# Patient Record
Sex: MALE | Race: WHITE | NOT HISPANIC OR LATINO | URBAN - METROPOLITAN AREA
[De-identification: names, ages, dates, MRNs, and addresses within clinical notes are randomized per-mention and may not be internally consistent; named-entity substitution may affect disease eponyms.]

---

## 2018-05-14 ENCOUNTER — OFFICE (OUTPATIENT)
Dept: URBAN - METROPOLITAN AREA CLINIC 75 | Facility: CLINIC | Age: 68
End: 2018-05-14

## 2018-05-14 VITALS
HEIGHT: 70 IN | WEIGHT: 158 LBS | HEART RATE: 76 BPM | SYSTOLIC BLOOD PRESSURE: 116 MMHG | DIASTOLIC BLOOD PRESSURE: 72 MMHG

## 2018-05-14 DIAGNOSIS — Z85.038 PERSONAL HISTORY OF OTHER MALIGNANT NEOPLASM OF LARGE INTEST: ICD-10-CM

## 2018-05-14 DIAGNOSIS — R19.7 DIARRHEA, UNSPECIFIED: ICD-10-CM

## 2018-05-14 PROCEDURE — 99203 OFFICE O/P NEW LOW 30 MIN: CPT | Performed by: INTERNAL MEDICINE

## 2018-05-14 RX ORDER — DICYCLOMINE HYDROCHLORIDE 20.6 MG/1
TABLET ORAL
Qty: 120 | Refills: 6 | Status: ACTIVE
Start: 2018-05-14

## 2019-01-24 ENCOUNTER — APPOINTMENT (OUTPATIENT)
Dept: LAB | Facility: HOSPITAL | Age: 69
End: 2019-01-24

## 2019-01-24 ENCOUNTER — OFFICE VISIT (OUTPATIENT)
Dept: GASTROENTEROLOGY | Facility: CLINIC | Age: 69
End: 2019-01-24

## 2019-01-24 VITALS
TEMPERATURE: 97.6 F | HEIGHT: 70 IN | DIASTOLIC BLOOD PRESSURE: 84 MMHG | BODY MASS INDEX: 22.5 KG/M2 | SYSTOLIC BLOOD PRESSURE: 136 MMHG | WEIGHT: 157.2 LBS

## 2019-01-24 DIAGNOSIS — C18.9 MALIGNANT NEOPLASM OF COLON, UNSPECIFIED PART OF COLON (HCC): ICD-10-CM

## 2019-01-24 DIAGNOSIS — R15.0 INCOMPLETE DEFECATION: ICD-10-CM

## 2019-01-24 DIAGNOSIS — R19.7 DIARRHEA, UNSPECIFIED TYPE: Primary | ICD-10-CM

## 2019-01-24 LAB
AMYLASE SERPL-CCNC: 99 U/L (ref 28–100)
BASOPHILS # BLD AUTO: 0.12 10*3/MM3 (ref 0–0.2)
BASOPHILS NFR BLD AUTO: 1.3 % (ref 0–1.5)
CEA SERPL-MCNC: 2.11 NG/ML
DEPRECATED RDW RBC AUTO: 47.8 FL (ref 37–54)
EOSINOPHIL # BLD AUTO: 0.36 10*3/MM3 (ref 0–0.7)
EOSINOPHIL NFR BLD AUTO: 3.9 % (ref 0.3–6.2)
ERYTHROCYTE [DISTWIDTH] IN BLOOD BY AUTOMATED COUNT: 13.9 % (ref 11.5–14.5)
HCT VFR BLD AUTO: 48.9 % (ref 40.4–52.2)
HGB BLD-MCNC: 15.9 G/DL (ref 13.7–17.6)
IMM GRANULOCYTES # BLD AUTO: 0.02 10*3/MM3 (ref 0–0.03)
IMM GRANULOCYTES NFR BLD AUTO: 0.2 % (ref 0–0.5)
LIPASE SERPL-CCNC: 20 U/L (ref 13–60)
LYMPHOCYTES # BLD AUTO: 2.43 10*3/MM3 (ref 0.9–4.8)
LYMPHOCYTES NFR BLD AUTO: 26.5 % (ref 19.6–45.3)
MCH RBC QN AUTO: 30.5 PG (ref 27–32.7)
MCHC RBC AUTO-ENTMCNC: 32.5 G/DL (ref 32.6–36.4)
MCV RBC AUTO: 93.7 FL (ref 79.8–96.2)
MONOCYTES # BLD AUTO: 0.68 10*3/MM3 (ref 0.2–1.2)
MONOCYTES NFR BLD AUTO: 7.4 % (ref 5–12)
NEUTROPHILS # BLD AUTO: 5.57 10*3/MM3 (ref 1.9–8.1)
NEUTROPHILS NFR BLD AUTO: 60.7 % (ref 42.7–76)
PLATELET # BLD AUTO: 264 10*3/MM3 (ref 140–500)
PMV BLD AUTO: 9.4 FL (ref 6–12)
RBC # BLD AUTO: 5.22 10*6/MM3 (ref 4.6–6)
TSH SERPL DL<=0.05 MIU/L-ACNC: 2.26 MIU/ML (ref 0.27–4.2)
WBC NRBC COR # BLD: 9.18 10*3/MM3 (ref 4.5–10.7)

## 2019-01-24 PROCEDURE — 82150 ASSAY OF AMYLASE: CPT | Performed by: INTERNAL MEDICINE

## 2019-01-24 PROCEDURE — 82378 CARCINOEMBRYONIC ANTIGEN: CPT | Performed by: INTERNAL MEDICINE

## 2019-01-24 PROCEDURE — 83516 IMMUNOASSAY NONANTIBODY: CPT | Performed by: INTERNAL MEDICINE

## 2019-01-24 PROCEDURE — 83690 ASSAY OF LIPASE: CPT | Performed by: INTERNAL MEDICINE

## 2019-01-24 PROCEDURE — 84443 ASSAY THYROID STIM HORMONE: CPT | Performed by: INTERNAL MEDICINE

## 2019-01-24 PROCEDURE — 82784 ASSAY IGA/IGD/IGG/IGM EACH: CPT | Performed by: INTERNAL MEDICINE

## 2019-01-24 PROCEDURE — 85025 COMPLETE CBC W/AUTO DIFF WBC: CPT | Performed by: INTERNAL MEDICINE

## 2019-01-24 PROCEDURE — 36415 COLL VENOUS BLD VENIPUNCTURE: CPT | Performed by: INTERNAL MEDICINE

## 2019-01-24 PROCEDURE — 99204 OFFICE O/P NEW MOD 45 MIN: CPT | Performed by: INTERNAL MEDICINE

## 2019-01-24 RX ORDER — GABAPENTIN 300 MG/1
CAPSULE ORAL
COMMUNITY
Start: 2019-01-16

## 2019-01-24 RX ORDER — ATORVASTATIN CALCIUM 20 MG/1
TABLET, FILM COATED ORAL
COMMUNITY
Start: 2018-11-07

## 2019-01-24 RX ORDER — ASPIRIN 81 MG/1
81 TABLET ORAL DAILY
COMMUNITY

## 2019-01-24 RX ORDER — TAMSULOSIN HYDROCHLORIDE 0.4 MG/1
CAPSULE ORAL
COMMUNITY
Start: 2019-01-16

## 2019-01-24 RX ORDER — MULTIPLE VITAMINS W/ MINERALS TAB 9MG-400MCG
1 TAB ORAL DAILY
COMMUNITY

## 2019-01-24 NOTE — PROGRESS NOTES
Chief Complaint   Patient presents with   • Diarrhea & fecal incontinence   • frequent bowel movements   • Abdominal Pain   • Heartburn       History of Present Illness: 67 yo male who has had two colon surgeries for colon cancer. C/o diarrhea and stool incontinence that started during his chemo in 2009 but really got bad in 2014. It is hard to say how many BM/day he has because it is constant and his still just runs out. He wears depends. NO rectal bleeding, he may have black stool or may be light colored. He was hospitalized in 6/18 when he was taking 36 imodiums/day and he had abdominal distension and bloating. Now if he takes imodium he will take imodium 12 at a time !!  Admitted to [facility]: given enemas and Miralax. No foreign travel. Last antibiotics: 2009. His stool may float when it is light colored.        He has lost weight from 190 to 157 since 2009. He weighed 145 in 2015. He usually sees Surgeons. His last colonoscopy was 2015 - Dr. Reeder at .       Nonsmoker since 2009. Rare ETOH.  We reviewed his CT abd/pelvis done in 6/18 at [facility]. He has tried a lactose free diet and it didn't help.     Past Medical History:   Diagnosis Date   • Cancer (CMS/HCC) 1995 & 2009    colon cancer   • Hyperlipidemia        Past Surgical History:   Procedure Laterality Date   • COLON RESECTION      x 2   • COLONOSCOPY  approx 2015    normal per patient         Current Outpatient Medications:   •  aspirin 81 MG EC tablet, Take 81 mg by mouth Daily., Disp: , Rfl:   •  atorvastatin (LIPITOR) 20 MG tablet, , Disp: , Rfl:   •  gabapentin (NEURONTIN) 300 MG capsule, , Disp: , Rfl:   •  Multiple Vitamins-Minerals (MULTIVITAMIN WITH MINERALS) tablet tablet, Take 1 tablet by mouth Daily., Disp: , Rfl:   •  tamsulosin (FLOMAX) 0.4 MG capsule 24 hr capsule, , Disp: , Rfl:     Allergies   Allergen Reactions   • Dilantin [Phenytoin] Hallucinations       Family History   Problem Relation Age of Onset   • Pancreatic cancer  Father    • Pancreatic cancer Sister    • Colon cancer Paternal Grandmother    • Gallbladder disease Sister        Social History     Socioeconomic History   • Marital status:      Spouse name: Not on file   • Number of children: Not on file   • Years of education: Not on file   • Highest education level: Not on file   Social Needs   • Financial resource strain: Not on file   • Food insecurity - worry: Not on file   • Food insecurity - inability: Not on file   • Transportation needs - medical: Not on file   • Transportation needs - non-medical: Not on file   Occupational History   • Not on file   Tobacco Use   • Smoking status: Former Smoker     Packs/day: 0.25     Types: Cigarettes     Last attempt to quit:      Years since quittin.0   • Smokeless tobacco: Never Used   Substance and Sexual Activity   • Alcohol use: Yes     Alcohol/week: 0.6 oz     Types: 1 Glasses of wine per week     Comment: seldom   • Drug use: No   • Sexual activity: Not on file   Other Topics Concern   • Not on file   Social History Narrative   • Not on file       Review of Systems   Gastrointestinal: Positive for diarrhea.   All other systems reviewed and are negative.      Vitals:    19 1036   BP: 136/84   Temp: 97.6 °F (36.4 °C)       Physical Exam   Constitutional: He is oriented to person, place, and time. He appears well-developed and well-nourished. No distress.   HENT:   Head: Normocephalic and atraumatic. Hair is normal.   Right Ear: Hearing, tympanic membrane, external ear and ear canal normal.   Left Ear: Hearing, tympanic membrane, external ear and ear canal normal.   Nose: No sinus tenderness or nasal deformity.   Mouth/Throat: Uvula is midline, oropharynx is clear and moist and mucous membranes are normal. No oral lesions. No uvula swelling.   Eyes: Conjunctivae, EOM and lids are normal. Pupils are equal, round, and reactive to light. Right eye exhibits no discharge. Left eye exhibits no discharge. No  scleral icterus. Right eye exhibits normal extraocular motion and no nystagmus. Left eye exhibits normal extraocular motion and no nystagmus.   Neck: Normal range of motion. Neck supple. No JVD present. No thyromegaly present.   Cardiovascular: Normal rate, regular rhythm, normal heart sounds, intact distal pulses and normal pulses. Exam reveals no gallop.   No murmur heard.  Pulmonary/Chest: Effort normal and breath sounds normal. No respiratory distress. He has no wheezes. He has no rales. He exhibits no tenderness.   Abdominal: Soft. Bowel sounds are normal. He exhibits no distension and no mass. There is no tenderness. There is no guarding. No hernia.   Genitourinary:   Genitourinary Comments: Refused rectal exam.    Musculoskeletal: Normal range of motion. He exhibits no edema, tenderness or deformity.   Lymphadenopathy:     He has no cervical adenopathy.   Neurological: He is alert and oriented to person, place, and time. He has normal reflexes. He displays normal reflexes. No cranial nerve deficit. He exhibits normal muscle tone. Coordination normal.   Skin: Skin is warm and dry. No rash noted. He is not diaphoretic.   Psychiatric: He has a normal mood and affect. His behavior is normal. Judgment and thought content normal.   Vitals reviewed.      Isreal was seen today for diarrhea & fecal incontinence, frequent bowel movements, abdominal pain and heartburn.    Diagnoses and all orders for this visit:    Diarrhea, unspecified type  -     CBC & Differential  -     Celiac Ab tTG DGP TIgA  -     TSH  -     Amylase  -     Lipase  -     Clostridium Difficile EIA - Stool, Per Rectum  -     Fecal Fat, Qualitative - Stool, Per Rectum  -     Fecal Leukocytes - Stool, Per Rectum  -     Ova & Parasite Examination - Stool, Per Rectum    Incomplete defecation  -     CBC & Differential  -     Celiac Ab tTG DGP TIgA  -     TSH  -     Amylase  -     Lipase  -     Clostridium Difficile EIA - Stool, Per Rectum  -     Fecal  Fat, Qualitative - Stool, Per Rectum  -     Fecal Leukocytes - Stool, Per Rectum  -     Ova & Parasite Examination - Stool, Per Rectum    Malignant neoplasm of colon, unspecified part of colon (CMS/HCC)  -     CEA       Assessment:  1) Diarrhea  2) h/o colon cancer x 2  3) Weight loss  4) FH (dad and sister) pancreatic cancer.    Recommendations:  1) Stool studies  2)   3) He wants to hold off on a colonoscopy for now.   4) Labs: CBC, TSH, celiac sprue antibody panel.   5) Patient to call for results. Consider colonoscopy?      Return Patient to call for results of labs. .    Art Colvin MD  1/24/2019

## 2019-01-25 LAB
GLIADIN PEPTIDE IGA SER-ACNC: 3 UNITS (ref 0–19)
GLIADIN PEPTIDE IGG SER-ACNC: 2 UNITS (ref 0–19)
IGA SERPL-MCNC: 341 MG/DL (ref 61–437)
TTG IGA SER-ACNC: <2 U/ML (ref 0–3)
TTG IGG SER-ACNC: 2 U/ML (ref 0–5)

## 2019-01-28 ENCOUNTER — TELEPHONE (OUTPATIENT)
Dept: GASTROENTEROLOGY | Facility: CLINIC | Age: 69
End: 2019-01-28

## 2019-01-28 NOTE — TELEPHONE ENCOUNTER
----- Message from Art Colvin MD sent at 1/26/2019  1:05 PM EST -----  Tell him that labs look good. We'll see what the stool studies show?. Thx. kjh

## 2019-01-28 NOTE — TELEPHONE ENCOUNTER
Call to pt.  Advise per Dr Colvin that labs look good.  Will see what the stool studies show.    Pt verb understanding.  States will turn in next week 2nd cold.

## 2019-04-08 ENCOUNTER — TELEPHONE (OUTPATIENT)
Dept: GASTROENTEROLOGY | Facility: CLINIC | Age: 69
End: 2019-04-08

## 2019-04-08 ENCOUNTER — PREP FOR SURGERY (OUTPATIENT)
Dept: OTHER | Facility: HOSPITAL | Age: 69
End: 2019-04-08

## 2019-04-08 DIAGNOSIS — R19.7 DIARRHEA: ICD-10-CM

## 2019-04-08 DIAGNOSIS — R63.4 WEIGHT LOSS: ICD-10-CM

## 2019-04-08 DIAGNOSIS — C18.9 COLON CANCER (HCC): Primary | ICD-10-CM

## 2019-04-08 NOTE — TELEPHONE ENCOUNTER
----- Message from Helen Yen sent at 4/8/2019 10:27 AM EDT -----  Regarding: QUESTION  Contact: [phone]  CONSIDERING C/S. WOULD LIKE TO SPEAK TO A NURSE 1ST.

## 2019-04-08 NOTE — TELEPHONE ENCOUNTER
Called pt and pt report he is getting the same symptoms as when he had colon cancer. Pt states he saw Dr Colvin in Jan 2019 and wanted to wait on c/s, but is now ready and he would like to have c/s.  ADvised pt will send message to Dr Colvin for case request and someone from scheduling would call him to arrange. Pt verb understanding.

## 2019-04-08 NOTE — TELEPHONE ENCOUNTER
I ordered a case request for him to have a colonoscopy.  SCHEDULING -please schedule him for a colonoscopy. dale

## 2019-04-10 PROBLEM — R63.4 WEIGHT LOSS: Status: ACTIVE | Noted: 2019-04-10

## 2019-04-10 PROBLEM — C18.9 COLON CANCER (HCC): Status: ACTIVE | Noted: 2019-04-10

## 2019-04-10 PROBLEM — R19.7 DIARRHEA: Status: ACTIVE | Noted: 2019-04-10

## 2019-05-30 ENCOUNTER — ANESTHESIA (OUTPATIENT)
Dept: GASTROENTEROLOGY | Facility: HOSPITAL | Age: 69
End: 2019-05-30

## 2019-05-30 ENCOUNTER — HOSPITAL ENCOUNTER (OUTPATIENT)
Facility: HOSPITAL | Age: 69
Setting detail: HOSPITAL OUTPATIENT SURGERY
Discharge: HOME | End: 2019-05-30
Attending: INTERNAL MEDICINE | Admitting: INTERNAL MEDICINE

## 2019-05-30 ENCOUNTER — ANESTHESIA EVENT (OUTPATIENT)
Dept: GASTROENTEROLOGY | Facility: HOSPITAL | Age: 69
End: 2019-05-30

## 2019-05-30 VITALS
HEART RATE: 53 BPM | RESPIRATION RATE: 18 BRPM | TEMPERATURE: 98.3 F | OXYGEN SATURATION: 97 % | DIASTOLIC BLOOD PRESSURE: 72 MMHG | HEIGHT: 70 IN | SYSTOLIC BLOOD PRESSURE: 128 MMHG | WEIGHT: 149 LBS | BODY MASS INDEX: 21.33 KG/M2

## 2019-05-30 DIAGNOSIS — R63.4 WEIGHT LOSS: ICD-10-CM

## 2019-05-30 DIAGNOSIS — C18.9 COLON CANCER (HCC): ICD-10-CM

## 2019-05-30 DIAGNOSIS — R19.7 DIARRHEA: ICD-10-CM

## 2019-05-30 PROCEDURE — 45380 COLONOSCOPY AND BIOPSY: CPT | Performed by: INTERNAL MEDICINE

## 2019-05-30 PROCEDURE — S0260 H&P FOR SURGERY: HCPCS | Performed by: INTERNAL MEDICINE

## 2019-05-30 PROCEDURE — 25010000002 PROPOFOL 10 MG/ML EMULSION: Performed by: ANESTHESIOLOGY

## 2019-05-30 PROCEDURE — 88305 TISSUE EXAM BY PATHOLOGIST: CPT | Performed by: INTERNAL MEDICINE

## 2019-05-30 RX ORDER — PROPOFOL 10 MG/ML
VIAL (ML) INTRAVENOUS CONTINUOUS PRN
Status: DISCONTINUED | OUTPATIENT
Start: 2019-05-30 | End: 2019-05-30 | Stop reason: SURG

## 2019-05-30 RX ORDER — PROPOFOL 10 MG/ML
VIAL (ML) INTRAVENOUS AS NEEDED
Status: DISCONTINUED | OUTPATIENT
Start: 2019-05-30 | End: 2019-05-30 | Stop reason: SURG

## 2019-05-30 RX ORDER — ALBUTEROL SULFATE 90 UG/1
2 AEROSOL, METERED RESPIRATORY (INHALATION) EVERY 4 HOURS PRN
COMMUNITY

## 2019-05-30 RX ORDER — SODIUM CHLORIDE 0.9 % (FLUSH) 0.9 %
3 SYRINGE (ML) INJECTION EVERY 12 HOURS SCHEDULED
Status: DISCONTINUED | OUTPATIENT
Start: 2019-05-30 | End: 2019-05-30 | Stop reason: HOSPADM

## 2019-05-30 RX ORDER — LIDOCAINE HYDROCHLORIDE 20 MG/ML
INJECTION, SOLUTION INFILTRATION; PERINEURAL AS NEEDED
Status: DISCONTINUED | OUTPATIENT
Start: 2019-05-30 | End: 2019-05-30 | Stop reason: SURG

## 2019-05-30 RX ORDER — SODIUM CHLORIDE, SODIUM LACTATE, POTASSIUM CHLORIDE, CALCIUM CHLORIDE 600; 310; 30; 20 MG/100ML; MG/100ML; MG/100ML; MG/100ML
30 INJECTION, SOLUTION INTRAVENOUS CONTINUOUS PRN
Status: DISCONTINUED | OUTPATIENT
Start: 2019-05-30 | End: 2019-05-30 | Stop reason: HOSPADM

## 2019-05-30 RX ORDER — SODIUM CHLORIDE 0.9 % (FLUSH) 0.9 %
3-10 SYRINGE (ML) INJECTION AS NEEDED
Status: DISCONTINUED | OUTPATIENT
Start: 2019-05-30 | End: 2019-05-30 | Stop reason: HOSPADM

## 2019-05-30 RX ADMIN — PROPOFOL 100 MCG/KG/MIN: 10 INJECTION, EMULSION INTRAVENOUS at 15:30

## 2019-05-30 RX ADMIN — PROPOFOL 100 MG: 10 INJECTION, EMULSION INTRAVENOUS at 15:30

## 2019-05-30 RX ADMIN — LIDOCAINE HYDROCHLORIDE 60 MG: 20 INJECTION, SOLUTION INFILTRATION; PERINEURAL at 15:30

## 2019-05-30 RX ADMIN — SODIUM CHLORIDE, POTASSIUM CHLORIDE, SODIUM LACTATE AND CALCIUM CHLORIDE 30 ML/HR: 600; 310; 30; 20 INJECTION, SOLUTION INTRAVENOUS at 13:55

## 2019-05-30 NOTE — ANESTHESIA POSTPROCEDURE EVALUATION
"Patient: Isreal Garcia    Procedure Summary     Date:  05/30/19 Room / Location:  [facility] ENDOSCOPY 4 /  [facility]    Anesthesia Start:  1521 Anesthesia Stop:  1556    Procedure:  COLONOSCOPY WITH COLD BX AND POLYPECTOMY (N/A ) Diagnosis:       Colon cancer (CMS/HCC)      Diarrhea      Weight loss      (Colon cancer (CMS/HCC) [C18.9])      (Diarrhea [R19.7])      (Weight loss [R63.4])    Surgeon:  Art Colvin MD Provider:  Baron Childress MD    Anesthesia Type:  MAC ASA Status:  2          Anesthesia Type: MAC  Last vitals  BP   128/72 (05/30/19 1615)   Temp   36.8 °C (98.3 °F) (05/30/19 1353)   Pulse   53 (05/30/19 1615)   Resp   18 (05/30/19 1615)     SpO2   97 % (05/30/19 1615)     Post Anesthesia Care and Evaluation    Patient participation: complete - patient cannot participate  Anesthetic complications: No anesthetic complications    Cardiovascular status: acceptable  Respiratory status: acceptable    Comments: Patient record reviewed. Patient stable and discharged prior to being evaluated. No apparent anesthetic complications.  THIS CASE IS NOT MEDICALLY DIRECTED.  /72 (BP Location: Left arm, Patient Position: Lying)   Pulse 53   Temp 36.8 °C (98.3 °F) (Oral)   Resp 18   Ht 177.8 cm (70\")   Wt 67.6 kg (149 lb)   SpO2 97%   BMI 21.38 kg/m²       "

## 2019-05-30 NOTE — ANESTHESIA PREPROCEDURE EVALUATION
Anesthesia Evaluation     Patient summary reviewed and Nursing notes reviewed   NPO Solid Status: > 8 hours  NPO Liquid Status: > 6 hours           Airway   Mallampati: II  TM distance: <3 FB  Neck ROM: full  Possible difficult intubation  Comment: Full bead  Dental    (+) lower dentures and upper dentures    Pulmonary - normal exam   (+) a smoker Former,   Cardiovascular - normal exam        Neuro/Psych  (+) numbness (neuropathy  not diabetic),     GI/Hepatic/Renal/Endo      Musculoskeletal     Abdominal    Substance History      OB/GYN          Other      history of cancer (colon)                  Anesthesia Plan    ASA 2     MAC     Anesthetic plan, all risks, benefits, and alternatives have been provided, discussed and informed consent has been obtained with: patient.

## 2019-06-04 LAB
CYTO UR: NORMAL
LAB AP CASE REPORT: NORMAL
PATH REPORT.FINAL DX SPEC: NORMAL
PATH REPORT.GROSS SPEC: NORMAL

## 2019-06-13 ENCOUNTER — OFFICE VISIT (OUTPATIENT)
Dept: GASTROENTEROLOGY | Facility: CLINIC | Age: 69
End: 2019-06-13

## 2019-06-13 VITALS
TEMPERATURE: 97.3 F | DIASTOLIC BLOOD PRESSURE: 70 MMHG | WEIGHT: 151.8 LBS | BODY MASS INDEX: 21.73 KG/M2 | SYSTOLIC BLOOD PRESSURE: 110 MMHG | HEIGHT: 70 IN

## 2019-06-13 DIAGNOSIS — R15.2 INCONTINENCE OF FECES WITH FECAL URGENCY: ICD-10-CM

## 2019-06-13 DIAGNOSIS — K58.2 IRRITABLE BOWEL SYNDROME WITH BOTH CONSTIPATION AND DIARRHEA: Primary | ICD-10-CM

## 2019-06-13 DIAGNOSIS — R15.9 INCONTINENCE OF FECES WITH FECAL URGENCY: ICD-10-CM

## 2019-06-13 DIAGNOSIS — Z85.038 HISTORY OF COLON CANCER: ICD-10-CM

## 2019-06-13 DIAGNOSIS — R14.0 ABDOMINAL BLOATING: ICD-10-CM

## 2019-06-13 PROCEDURE — 99214 OFFICE O/P EST MOD 30 MIN: CPT | Performed by: NURSE PRACTITIONER

## 2019-06-13 NOTE — PROGRESS NOTES
Chief Complaint   Patient presents with   • Follow-up     post scopes   • Fecal Incontinence       Isreal Garcia is a  68 y.o. male here for a follow up visit for fecal incontinence.     HPI  67 yo m presents today for follow up visit for changes in bowel habits with fecal incontinence. He is a patient of Dr. Colvin. He was last seen in the office on 1/24/19. He underwent Colonoscopy on 5/30/19. It showed poor prep, 1-4 mm colon polyp and a mild stricture about 10 cm from anus where a surgical anastomosis is located. Path showed TA polyp. Recall in 1 year due to poor prep. He does have hx Colon cancer x 2 (had chemo and radiation in the past). He admits his bowels have been moving better since the colonoscopy. He admits he even had some formed stool this past week. He did turn in his stool studies today. He admits he still has issues with fecal urgency and leakage. He has used Imodium to extreme excess in the past and has stopped using it. He does use miralax every once in a while and he admits he has tried fiber in the past but nothing has really worked well to help me have a formed, routine stool. He denies any dysphagia, reflux, abd pain, N&V, rectal bleeding or melena. He admits his appetite is ok and his weight is stable. He does have FH pancreatic cancer (father and sister). Recent labs were stable.       Past Medical History:   Diagnosis Date   • BPH (benign prostatic hyperplasia)    • Cancer (CMS/HCC) 1995 & 2009    colon cancer   • Hyperlipidemia        Past Surgical History:   Procedure Laterality Date   • AMPUTATION FOOT Left    • COLON RESECTION      x 2   • COLON SURGERY      x2   • COLONOSCOPY  approx 2015    normal per patient   • COLONOSCOPY N/A 5/30/2019    TA, mild stricture about 10cms from anus where a surgical anastomosis is located       Scheduled Meds:    Continuous Infusions:  No current facility-administered medications for this visit.     PRN Meds:.    Allergies   Allergen Reactions   •  Dilaudid [Hydromorphone Hcl] Hallucinations       Social History     Socioeconomic History   • Marital status:      Spouse name: Not on file   • Number of children: Not on file   • Years of education: Not on file   • Highest education level: Not on file   Tobacco Use   • Smoking status: Former Smoker     Packs/day: 0.25     Types: Cigarettes     Last attempt to quit:      Years since quittin.4   • Smokeless tobacco: Never Used   Substance and Sexual Activity   • Alcohol use: Yes     Alcohol/week: 0.6 oz     Types: 1 Glasses of wine per week     Comment: seldom   • Drug use: No   • Sexual activity: Defer       Family History   Problem Relation Age of Onset   • Pancreatic cancer Father    • Pancreatic cancer Sister    • Colon cancer Paternal Grandmother    • Gallbladder disease Sister        Review of Systems   Constitutional: Negative for appetite change, chills, diaphoresis, fatigue, fever and unexpected weight change.   HENT: Negative for nosebleeds, postnasal drip, sore throat, trouble swallowing and voice change.    Respiratory: Negative for cough, choking, chest tightness, shortness of breath and wheezing.    Cardiovascular: Negative for chest pain, palpitations and leg swelling.   Gastrointestinal: Positive for abdominal distention and constipation. Negative for abdominal pain, anal bleeding, blood in stool, diarrhea, nausea, rectal pain and vomiting.   Endocrine: Negative for polydipsia, polyphagia and polyuria.   Musculoskeletal: Negative for gait problem.   Skin: Negative for rash and wound.   Allergic/Immunologic: Negative for food allergies.   Neurological: Negative for dizziness, speech difficulty and light-headedness.   Psychiatric/Behavioral: Negative for confusion, self-injury, sleep disturbance and suicidal ideas.       Vitals:    19 1150   BP: 110/70   Temp: 97.3 °F (36.3 °C)       Physical Exam   Constitutional: He is oriented to person, place, and time. He appears  well-developed and well-nourished. He does not appear ill. No distress.   HENT:   Head: Normocephalic.   Eyes: Pupils are equal, round, and reactive to light.   Cardiovascular: Normal rate, regular rhythm and normal heart sounds.   Pulmonary/Chest: Effort normal and breath sounds normal.   Abdominal: Soft. Bowel sounds are normal. He exhibits no distension and no mass. There is no hepatosplenomegaly. There is no tenderness. There is no rebound and no guarding. No hernia.   Musculoskeletal: Normal range of motion.   Neurological: He is alert and oriented to person, place, and time.   Skin: Skin is warm and dry.   Psychiatric: He has a normal mood and affect. His speech is normal and behavior is normal. Judgment normal.       No images are attached to the encounter.    Assessment & Plan     1. Irritable bowel syndrome with both constipation and diarrhea    2. Incontinence of feces with fecal urgency    3. Abdominal bloating    4. History of colon cancer    I reviewed the scope report with him today. Sounds like his underlining issue is chronic constipation with fecal leakage. Will have him trial miralax daily and add a daily fiber supplement. Patient to call office in 1-2 weeks with update. Follow up with me in office in 3 weeks. Call office with any issues. He will need repeat colonoscopy next year due to poor prep and his Colon cancer hx.

## 2019-06-14 LAB
C DIFF TOX A+B STL QL IA: NEGATIVE
O+P SPEC MICRO: NORMAL
O+P STL CONC: NORMAL
SPECIMEN STATUS: NORMAL
WBC STL QL MICRO: NORMAL
WBC STL QL MICRO: NORMAL

## 2019-06-17 ENCOUNTER — TELEPHONE (OUTPATIENT)
Dept: GASTROENTEROLOGY | Facility: CLINIC | Age: 69
End: 2019-06-17

## 2019-06-17 LAB
FAT STL QL: NORMAL
NEUTRAL FAT STL QL: NORMAL

## 2019-06-17 NOTE — TELEPHONE ENCOUNTER
----- Message from Art Colvin MD sent at 6/10/2019 12:47 PM EDT -----  Please tell him that the colon polyp that was removed was not cancerous but was precancerous.  The other biopsies looked okay.  Because he had a poor colonic preparation I would recommend that we do repeat colonoscopy in 1 year.  At that time I would want to use a more aggressive colonoscopy prep such as a split dose GoLYTELY prep?  Please fax a copy of this report to his PCP.. Terrance. [email or web address]

## 2019-06-17 NOTE — TELEPHONE ENCOUNTER
Results reviewed with pt at f/u visit with Gabby NP on 06/13/2019.    C/s placed in recall for 05/30/2020.    Path report faxed to Dr Delgado thru [facility].

## 2019-06-20 ENCOUNTER — TELEPHONE (OUTPATIENT)
Dept: GASTROENTEROLOGY | Facility: CLINIC | Age: 69
End: 2019-06-20

## 2019-06-20 NOTE — TELEPHONE ENCOUNTER
Called pt and advised per Dr Colvin that the stool studies that have come back so far are normal which is good. Pt verb understanding.

## 2019-06-20 NOTE — TELEPHONE ENCOUNTER
----- Message from Art Colvin MD sent at 6/18/2019  6:47 AM EDT -----  Tell him that his stool studies have come back normal so far, which is good.. Thx. kjh

## 2019-07-03 ENCOUNTER — TELEPHONE (OUTPATIENT)
Dept: GASTROENTEROLOGY | Facility: CLINIC | Age: 69
End: 2019-07-03

## 2019-07-03 ENCOUNTER — OFFICE VISIT (OUTPATIENT)
Dept: GASTROENTEROLOGY | Facility: CLINIC | Age: 69
End: 2019-07-03

## 2019-07-03 VITALS
WEIGHT: 152 LBS | BODY MASS INDEX: 21.76 KG/M2 | SYSTOLIC BLOOD PRESSURE: 118 MMHG | HEIGHT: 70 IN | TEMPERATURE: 97.7 F | DIASTOLIC BLOOD PRESSURE: 72 MMHG

## 2019-07-03 DIAGNOSIS — R15.9 INCONTINENCE OF FECES WITH FECAL URGENCY: ICD-10-CM

## 2019-07-03 DIAGNOSIS — Z85.038 HISTORY OF COLON CANCER: ICD-10-CM

## 2019-07-03 DIAGNOSIS — D12.6 ADENOMATOUS POLYP OF COLON, UNSPECIFIED PART OF COLON: ICD-10-CM

## 2019-07-03 DIAGNOSIS — K58.0 IRRITABLE BOWEL SYNDROME WITH DIARRHEA: Primary | ICD-10-CM

## 2019-07-03 DIAGNOSIS — R15.2 INCONTINENCE OF FECES WITH FECAL URGENCY: ICD-10-CM

## 2019-07-03 PROCEDURE — 99214 OFFICE O/P EST MOD 30 MIN: CPT | Performed by: NURSE PRACTITIONER

## 2019-07-03 RX ORDER — POLYETHYLENE GLYCOL 3350 17 G/17G
17 POWDER, FOR SOLUTION ORAL
COMMUNITY
Start: 2018-06-10 | End: 2019-07-03

## 2019-07-03 RX ORDER — ALBUTEROL SULFATE 90 UG/1
2 AEROSOL, METERED RESPIRATORY (INHALATION)
COMMUNITY
Start: 2019-06-11 | End: 2020-06-10

## 2019-07-03 RX ORDER — TIOTROPIUM BROMIDE 18 UG/1
CAPSULE ORAL; RESPIRATORY (INHALATION)
COMMUNITY
Start: 2019-06-12

## 2019-07-03 RX ORDER — MONTELUKAST SODIUM 4 MG/1
TABLET, CHEWABLE ORAL
Qty: 60 TABLET | Refills: 11 | Status: SHIPPED | OUTPATIENT
Start: 2019-07-03

## 2019-07-03 NOTE — TELEPHONE ENCOUNTER
Call from pt.   at pharmacy to  colestid and has been advised needs pa.     Call to [facility] @ [phone] and spoke with Katerine.  PA has been submitted thru [facility].      Message to Mahogany DUTTA

## 2019-07-03 NOTE — PROGRESS NOTES
Chief Complaint   Patient presents with   • Irritable Bowel Syndrome       Isreal Garcia is a  68 y.o. male here for a follow up visit for IBS.    HPI  69 yo m presents today for follow up visit for IBS-D. He is a patient of Dr. Colvin and was last seen in the office on 19. He admits taking the miralax and the fiber daily did help with the abdominal cramping and the gas but did nothing for the diarrhea, fecal incontinence or the urgency. He is still taking 6 imodium on  and Sundays. He denies any dysphagia, reflux, abd pain, N&V, constipation, rectal bleeding or melena. He does have hx colon cancer in  & . He also has hx TA colon polyps and will be due for another colonoscopy next year. He admits his appetite is good and his weight is stable.         Past Medical History:   Diagnosis Date   • BPH (benign prostatic hyperplasia)    • Cancer (CMS/HCC)  &     colon cancer   • Hyperlipidemia        Past Surgical History:   Procedure Laterality Date   • AMPUTATION FOOT Left    • COLON RESECTION      x 2   • COLON SURGERY      x2   • COLONOSCOPY  approx     normal per patient   • COLONOSCOPY N/A 2019    TA, mild stricture about 10cms from anus where a surgical anastomosis is located       Scheduled Meds:    Continuous Infusions:  No current facility-administered medications for this visit.     PRN Meds:.    Allergies   Allergen Reactions   • Dilaudid [Hydromorphone Hcl] Hallucinations       Social History     Socioeconomic History   • Marital status:      Spouse name: Not on file   • Number of children: Not on file   • Years of education: Not on file   • Highest education level: Not on file   Tobacco Use   • Smoking status: Former Smoker     Packs/day: 0.25     Types: Cigarettes     Last attempt to quit:      Years since quittin.5   • Smokeless tobacco: Never Used   Substance and Sexual Activity   • Alcohol use: Yes     Alcohol/week: 0.6 oz     Types: 1 Glasses of wine  per week     Comment: seldom   • Drug use: No   • Sexual activity: Defer       Family History   Problem Relation Age of Onset   • Pancreatic cancer Father    • Pancreatic cancer Sister    • Colon cancer Paternal Grandmother    • Gallbladder disease Sister        Review of Systems   Constitutional: Negative for appetite change, chills, diaphoresis, fatigue, fever and unexpected weight change.   HENT: Negative for nosebleeds, postnasal drip, sore throat, trouble swallowing and voice change.    Respiratory: Negative for cough, choking, chest tightness, shortness of breath and wheezing.    Cardiovascular: Negative for chest pain, palpitations and leg swelling.   Gastrointestinal: Positive for diarrhea. Negative for abdominal distention, abdominal pain, anal bleeding, blood in stool, constipation, nausea, rectal pain and vomiting.   Endocrine: Negative for polydipsia, polyphagia and polyuria.   Musculoskeletal: Negative for gait problem.   Skin: Negative for rash and wound.   Allergic/Immunologic: Negative for food allergies.   Neurological: Negative for dizziness, speech difficulty and light-headedness.   Psychiatric/Behavioral: Negative for confusion, self-injury, sleep disturbance and suicidal ideas.       Vitals:    07/03/19 1015   BP: 118/72   Temp: 97.7 °F (36.5 °C)       Physical Exam   Constitutional: He is oriented to person, place, and time. He appears well-developed and well-nourished. He does not appear ill. No distress.   HENT:   Head: Normocephalic.   Eyes: Pupils are equal, round, and reactive to light.   Cardiovascular: Normal rate, regular rhythm and normal heart sounds.   Pulmonary/Chest: Effort normal and breath sounds normal.   Abdominal: Soft. Bowel sounds are normal. He exhibits no distension and no mass. There is no hepatosplenomegaly. There is no tenderness. There is no rebound and no guarding. No hernia.   Musculoskeletal: Normal range of motion.   Neurological: He is alert and oriented to  person, place, and time.   Skin: Skin is warm and dry.   Psychiatric: He has a normal mood and affect. His speech is normal and behavior is normal. Judgment normal.       No images are attached to the encounter.    Assessment & Plan     1. Irritable bowel syndrome with diarrhea    2. Incontinence of feces with fecal urgency    3. Adenomatous polyp of colon, unspecified part of colon    4. History of colon cancer    IBS-D is not well controlled. Will stop the miralax. Will increase the fiber to BID and go ahead and trial some colestid 1 gram daily and see how he does. Continue Imodium OTC PRN. Call office in 1-2 weeks with update. Follow up with me in 1 month. Call office with any issues.

## 2019-07-10 ENCOUNTER — TELEPHONE (OUTPATIENT)
Dept: GASTROENTEROLOGY | Facility: CLINIC | Age: 69
End: 2019-07-10

## 2019-07-10 ENCOUNTER — PRIOR AUTHORIZATION (OUTPATIENT)
Dept: GASTROENTEROLOGY | Facility: CLINIC | Age: 69
End: 2019-07-10

## 2019-07-10 NOTE — TELEPHONE ENCOUNTER
----- Message from Mariangel Garcia sent at 7/10/2019  2:16 PM EDT -----  Regarding: FW: call back/PA  Contact: [phone]      ----- Message -----  From: Mahogany Lord RN  Sent: 7/9/2019   4:07 PM  To: Mariangel Garcia  Subject: RE: call back/PA                                 Marii pt   ----- Message -----  From: Mariangel Garcia  Sent: 7/9/2019   3:13 PM  To: Maria Fernanda Balderas [address]  Subject: call back/PA                                     Pt called again for status of PA

## (undated) DEVICE — THE TORRENT IRRIGATION SCOPE CONNECTOR IS USED WITH THE TORRENT IRRIGATION TUBING TO PROVIDE IRRIGATION FLUIDS SUCH AS STERILE WATER DURING GASTROINTESTINAL ENDOSCOPIC PROCEDURES WHEN USED IN CONJUNCTION WITH AN IRRIGATION PUMP (OR ELECTROSURGICAL UNIT).: Brand: TORRENT

## (undated) DEVICE — SINGLE-USE BIOPSY FORCEPS: Brand: RADIAL JAW 4

## (undated) DEVICE — TUBING, SUCTION, 1/4" X 10', STRAIGHT: Brand: MEDLINE

## (undated) DEVICE — CANNULA,ADULT,SOFT-TOUCH,7'TUBE,UC: Brand: PENDING

## (undated) DEVICE — Device: Brand: DEFENDO AIR/WATER/SUCTION AND BIOPSY VALVE